# Patient Record
Sex: FEMALE | Race: WHITE | NOT HISPANIC OR LATINO | Employment: OTHER | URBAN - METROPOLITAN AREA
[De-identification: names, ages, dates, MRNs, and addresses within clinical notes are randomized per-mention and may not be internally consistent; named-entity substitution may affect disease eponyms.]

---

## 2022-03-20 ENCOUNTER — HOSPITAL ENCOUNTER (EMERGENCY)
Facility: HOSPITAL | Age: 42
Discharge: PSYCHIATRIC OR SUBSTANCE USE FACILITY | End: 2022-03-20
Attending: EMERGENCY MEDICINE
Payer: COMMERCIAL

## 2022-03-20 VITALS
BODY MASS INDEX: 28.12 KG/M2 | TEMPERATURE: 98 F | SYSTOLIC BLOOD PRESSURE: 134 MMHG | HEART RATE: 95 BPM | DIASTOLIC BLOOD PRESSURE: 66 MMHG | WEIGHT: 175 LBS | RESPIRATION RATE: 16 BRPM | OXYGEN SATURATION: 95 % | HEIGHT: 66 IN

## 2022-03-20 DIAGNOSIS — F10.929 ALCOHOLIC INTOXICATION WITH COMPLICATION: Primary | ICD-10-CM
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
## 2022-03-20 DIAGNOSIS — R41.82 AMS (ALTERED MENTAL STATUS): ICD-10-CM

## 2022-03-20 DIAGNOSIS — S09.93XA FACIAL INJURY, INITIAL ENCOUNTER: ICD-10-CM

## 2022-03-20 DIAGNOSIS — R45.851 SUICIDAL IDEATION: ICD-10-CM

## 2022-03-20 PROBLEM — F32.9 MAJOR DEPRESSIVE DISORDER: Status: ACTIVE | Noted: 2022-03-20

## 2022-03-20 LAB
ALBUMIN SERPL BCP-MCNC: 4 G/DL (ref 3.5–5.2)
ALP SERPL-CCNC: 45 U/L (ref 55–135)
ALT SERPL W/O P-5'-P-CCNC: 39 U/L (ref 10–44)
AMPHET+METHAMPHET UR QL: NEGATIVE
ANION GAP SERPL CALC-SCNC: 15 MMOL/L (ref 8–16)
APAP SERPL-MCNC: <3 UG/ML (ref 10–20)
AST SERPL-CCNC: 29 U/L (ref 10–40)
B-HCG UR QL: NEGATIVE
BARBITURATES UR QL SCN>200 NG/ML: NEGATIVE
BASOPHILS # BLD AUTO: 0.05 K/UL (ref 0–0.2)
BASOPHILS NFR BLD: 0.8 % (ref 0–1.9)
BENZODIAZ UR QL SCN>200 NG/ML: NEGATIVE
BILIRUB SERPL-MCNC: 0.3 MG/DL (ref 0.1–1)
BILIRUB UR QL STRIP: NEGATIVE
BUN SERPL-MCNC: 10 MG/DL (ref 6–20)
BZE UR QL SCN: NEGATIVE
CALCIUM SERPL-MCNC: 9.4 MG/DL (ref 8.7–10.5)
CANNABINOIDS UR QL SCN: ABNORMAL
CHLORIDE SERPL-SCNC: 109 MMOL/L (ref 95–110)
CLARITY UR REFRACT.AUTO: CLEAR
CO2 SERPL-SCNC: 21 MMOL/L (ref 23–29)
COLOR UR AUTO: NORMAL
CREAT SERPL-MCNC: 0.7 MG/DL (ref 0.5–1.4)
CREAT UR-MCNC: 10 MG/DL (ref 15–325)
CTP QC/QA: YES
CTP QC/QA: YES
DIFFERENTIAL METHOD: ABNORMAL
EOSINOPHIL # BLD AUTO: 0.2 K/UL (ref 0–0.5)
EOSINOPHIL NFR BLD: 2.9 % (ref 0–8)
ERYTHROCYTE [DISTWIDTH] IN BLOOD BY AUTOMATED COUNT: 12.4 % (ref 11.5–14.5)
EST. GFR  (AFRICAN AMERICAN): >60 ML/MIN/1.73 M^2
EST. GFR  (NON AFRICAN AMERICAN): >60 ML/MIN/1.73 M^2
ETHANOL SERPL-MCNC: 116 MG/DL
ETHANOL SERPL-MCNC: 238 MG/DL
ETHANOL SERPL-MCNC: 84 MG/DL
GLUCOSE SERPL-MCNC: 112 MG/DL (ref 70–110)
GLUCOSE UR QL STRIP: NEGATIVE
HCT VFR BLD AUTO: 42.5 % (ref 37–48.5)
HGB BLD-MCNC: 14.4 G/DL (ref 12–16)
HGB UR QL STRIP: NEGATIVE
IMM GRANULOCYTES # BLD AUTO: 0.01 K/UL (ref 0–0.04)
IMM GRANULOCYTES NFR BLD AUTO: 0.2 % (ref 0–0.5)
KETONES UR QL STRIP: NEGATIVE
LEUKOCYTE ESTERASE UR QL STRIP: NEGATIVE
LYMPHOCYTES # BLD AUTO: 2.4 K/UL (ref 1–4.8)
LYMPHOCYTES NFR BLD: 37.8 % (ref 18–48)
MCH RBC QN AUTO: 31.4 PG (ref 27–31)
MCHC RBC AUTO-ENTMCNC: 33.9 G/DL (ref 32–36)
MCV RBC AUTO: 93 FL (ref 82–98)
METHADONE UR QL SCN>300 NG/ML: NEGATIVE
MONOCYTES # BLD AUTO: 0.5 K/UL (ref 0.3–1)
MONOCYTES NFR BLD: 7.2 % (ref 4–15)
NEUTROPHILS # BLD AUTO: 3.2 K/UL (ref 1.8–7.7)
NEUTROPHILS NFR BLD: 51.1 % (ref 38–73)
NITRITE UR QL STRIP: NEGATIVE
NRBC BLD-RTO: 0 /100 WBC
OPIATES UR QL SCN: NEGATIVE
PCP UR QL SCN>25 NG/ML: NEGATIVE
PH UR STRIP: 6 [PH] (ref 5–8)
PLATELET # BLD AUTO: 193 K/UL (ref 150–450)
PMV BLD AUTO: 11.3 FL (ref 9.2–12.9)
POTASSIUM SERPL-SCNC: 4.2 MMOL/L (ref 3.5–5.1)
PROT SERPL-MCNC: 8 G/DL (ref 6–8.4)
PROT UR QL STRIP: NEGATIVE
RBC # BLD AUTO: 4.59 M/UL (ref 4–5.4)
SARS-COV-2 RDRP RESP QL NAA+PROBE: NEGATIVE
SODIUM SERPL-SCNC: 145 MMOL/L (ref 136–145)
SP GR UR STRIP: 1 (ref 1–1.03)
TOXICOLOGY INFORMATION: ABNORMAL
TSH SERPL DL<=0.005 MIU/L-ACNC: 2.57 UIU/ML (ref 0.4–4)
URN SPEC COLLECT METH UR: NORMAL
WBC # BLD AUTO: 6.27 K/UL (ref 3.9–12.7)

## 2022-03-20 PROCEDURE — U0002 COVID-19 LAB TEST NON-CDC: HCPCS | Performed by: EMERGENCY MEDICINE

## 2022-03-20 PROCEDURE — 82077 ASSAY SPEC XCP UR&BREATH IA: CPT | Mod: 91 | Performed by: EMERGENCY MEDICINE

## 2022-03-20 PROCEDURE — 81025 URINE PREGNANCY TEST: CPT | Performed by: EMERGENCY MEDICINE

## 2022-03-20 PROCEDURE — 25000003 PHARM REV CODE 250: Performed by: EMERGENCY MEDICINE

## 2022-03-20 PROCEDURE — 93005 ELECTROCARDIOGRAM TRACING: CPT

## 2022-03-20 PROCEDURE — 80307 DRUG TEST PRSMV CHEM ANLYZR: CPT | Performed by: EMERGENCY MEDICINE

## 2022-03-20 PROCEDURE — 87389 HIV-1 AG W/HIV-1&-2 AB AG IA: CPT | Performed by: EMERGENCY MEDICINE

## 2022-03-20 PROCEDURE — 96361 HYDRATE IV INFUSION ADD-ON: CPT

## 2022-03-20 PROCEDURE — 99285 EMERGENCY DEPT VISIT HI MDM: CPT | Mod: 25

## 2022-03-20 PROCEDURE — 80143 DRUG ASSAY ACETAMINOPHEN: CPT | Performed by: EMERGENCY MEDICINE

## 2022-03-20 PROCEDURE — 96375 TX/PRO/DX INJ NEW DRUG ADDON: CPT

## 2022-03-20 PROCEDURE — 81003 URINALYSIS AUTO W/O SCOPE: CPT | Mod: 59 | Performed by: EMERGENCY MEDICINE

## 2022-03-20 PROCEDURE — 80053 COMPREHEN METABOLIC PANEL: CPT | Performed by: EMERGENCY MEDICINE

## 2022-03-20 PROCEDURE — 99285 EMERGENCY DEPT VISIT HI MDM: CPT | Mod: CS,,, | Performed by: EMERGENCY MEDICINE

## 2022-03-20 PROCEDURE — 63600175 PHARM REV CODE 636 W HCPCS

## 2022-03-20 PROCEDURE — 93010 ELECTROCARDIOGRAM REPORT: CPT | Mod: ,,, | Performed by: INTERNAL MEDICINE

## 2022-03-20 PROCEDURE — 86803 HEPATITIS C AB TEST: CPT | Performed by: EMERGENCY MEDICINE

## 2022-03-20 PROCEDURE — 85025 COMPLETE CBC W/AUTO DIFF WBC: CPT | Performed by: EMERGENCY MEDICINE

## 2022-03-20 PROCEDURE — 99285 PR EMERGENCY DEPT VISIT,LEVEL V: ICD-10-PCS | Mod: CS,,, | Performed by: EMERGENCY MEDICINE

## 2022-03-20 PROCEDURE — 93010 EKG 12-LEAD: ICD-10-PCS | Mod: ,,, | Performed by: INTERNAL MEDICINE

## 2022-03-20 PROCEDURE — 63600175 PHARM REV CODE 636 W HCPCS: Performed by: EMERGENCY MEDICINE

## 2022-03-20 PROCEDURE — 96374 THER/PROPH/DIAG INJ IV PUSH: CPT

## 2022-03-20 PROCEDURE — 84443 ASSAY THYROID STIM HORMONE: CPT | Performed by: EMERGENCY MEDICINE

## 2022-03-20 RX ORDER — IBUPROFEN 400 MG/1
400 TABLET ORAL
Status: COMPLETED | OUTPATIENT
Start: 2022-03-20 | End: 2022-03-20

## 2022-03-20 RX ORDER — ACETAMINOPHEN 500 MG
1000 TABLET ORAL
Status: COMPLETED | OUTPATIENT
Start: 2022-03-20 | End: 2022-03-20

## 2022-03-20 RX ORDER — ONDANSETRON 2 MG/ML
4 INJECTION INTRAMUSCULAR; INTRAVENOUS
Status: COMPLETED | OUTPATIENT
Start: 2022-03-20 | End: 2022-03-20

## 2022-03-20 RX ORDER — CALCIUM CARBONATE 200(500)MG
1000 TABLET,CHEWABLE ORAL
Status: COMPLETED | OUTPATIENT
Start: 2022-03-20 | End: 2022-03-20

## 2022-03-20 RX ORDER — DROPERIDOL 2.5 MG/ML
2.5 INJECTION, SOLUTION INTRAMUSCULAR; INTRAVENOUS ONCE
Status: COMPLETED | OUTPATIENT
Start: 2022-03-20 | End: 2022-03-20

## 2022-03-20 RX ORDER — ONDANSETRON 2 MG/ML
INJECTION INTRAMUSCULAR; INTRAVENOUS
Status: COMPLETED
Start: 2022-03-20 | End: 2022-03-20

## 2022-03-20 RX ADMIN — ACETAMINOPHEN 1000 MG: 500 TABLET ORAL at 05:03

## 2022-03-20 RX ADMIN — ONDANSETRON 4 MG: 2 INJECTION INTRAMUSCULAR; INTRAVENOUS at 09:03

## 2022-03-20 RX ADMIN — SODIUM CHLORIDE 1000 ML: 0.9 INJECTION, SOLUTION INTRAVENOUS at 02:03

## 2022-03-20 RX ADMIN — CALCIUM CARBONATE (ANTACID) CHEW TAB 500 MG 1000 MG: 500 CHEW TAB at 03:03

## 2022-03-20 RX ADMIN — SODIUM CHLORIDE 1000 ML: 0.9 INJECTION, SOLUTION INTRAVENOUS at 11:03

## 2022-03-20 RX ADMIN — DROPERIDOL 2.5 MG: 2.5 INJECTION, SOLUTION INTRAMUSCULAR; INTRAVENOUS at 03:03

## 2022-03-20 RX ADMIN — IBUPROFEN 400 MG: 400 TABLET, FILM COATED ORAL at 09:03

## 2022-03-20 NOTE — HPI
"Perri Lemus is a 41 y.o. female with a past psychiatric history of generalized anxiety, currently presenting with fall in the setting of alcohol intoxication.  Psychiatry was originally consulted to address the patient's symptoms of suicidal ideation.    Initial Evaluation   Per Primary MD:  Perri Lemus is a 41 y.o. female with no known past medical history presenting to emergency department by EMS with complaint of alcohol intoxication, altered mental status, suicidal ideation, and fall with facial trauma.     Per EMS, patient was drinking alcohol with family members at a local bar when she bent over to pet a dog and fell forward onto her face.  She lost consciousness.  911 was called.  When EMS arrived, patient was tearful, crying, stating that she wanted to kill herself.  She stated that she was overwhelmed.     On my evaluation, patient is too somnolent to answer questions.     Spoke with patient's cousin who is present at bedside.  Patient's cousin states that 12 hours ago, when patient was sober, patient told her cousin that she wanted to hurt herself but did not say whether she had a plan.     Cousin knows that patient has history of depression and anxiety.  Unclear whether she has a history of previous psychiatric hospitalizations or suicidal ideation.     I was unable to reach the patient's  by phone to obtain additional history    Per [facility] Psych MD:  Patient seen in room, resting in bed. Reports that she had been drinking heavily yesterday when at a bar with her family and blacked out. Remembers having two glasses of wine, multiple shots of vodka, and a martini. Admitted to the hospital after she fell off her barstool while bending over to pet a dog. Patient does not remember making statements about suicide to the staff in the ED. However, she does admit to current passive suicidal thoughts, ("fall asleep and don't wake up"). She lives in [place] with her  but has been " "in [place] for the past week while visiting and caring for her mother who was recently placed in a nursing home. Reports feelings of depression for the last year associated with increased sleep, anhedonia, feelings of guilt, low energy, poor concentration. She reports significantly cutting back her drinking one year ago. Previously drank 1-2 bottles of wine per day but now reports she drinks only socially. States that prior to last night, her last drink was in December, the last time she was in [place]. Admits that her mind goes to "dark places" when she is drinking and that her suicidal ideation gets significantly worse. Treated for anxiety with Effexor, recently increased from 150 mg to 225 mg one month ago. Reports that it has helped her anxiety significantly but she remains depressed. Also prescribed 0.5 mg Ativan for breakthrough anxiety, which she takes once or twice per week. Previous suicide attempt via overdose on Ativan >1 year ago. Patient reports that she was drinking at the time and was hospitalized in a psychiatric hospital. She does not see a psychiatrist. Denies history of corina or psychosis. Reports regular marijuana use.  has multiple firearms in his home in [place].    Collateral:   Yes - Aunt present. Reports that patient has made passive suicidal statements since arrival in [place]. She also reports patient has been drinking significantly more than she says. States she has drank at least 1.5 bottles of wine per night for the last week.     Psychiatric Review Of Systems - Is patient experiencing or having changes in:  sleep: yes, increased  appetite/weight: no  energy/anergy: yes  interest/pleasure/anhedonia: yes  somatic symptoms: no  guilty/hopelessness: yes  concentration: yes  S.I.B.s/risky behavior: no  SI/SA:  yes  anxiety/panic: yes  Irritability: no  Racing thoughts: no  Pressured speech:  no  Paranoia:no  Delusions: no  AVH:no    Medical Review Of Systems:  Pertinent " "items noted in HPI    Hospital Course   See HPI    History   Psychiatric History:  Diagnose(s): Yes - generalized anxiety disorder  Current Psychiatric Medications: Yes - Effexor 225 mg daily, Ativan 0.5 mg PRN  Previous Medication Trials: No  Previous Psychiatric Hospitalizations: Yes  Previous Suicide Attempts: Yes  History of Violence: No  Outpatient Psychiatrist: No    Social History:  Marital Status:   Children: 0   Employment Status: currently employed  Housing Status: Yes - with   History of Abuse: No  Access to Gun: Yes -  owns multiple guns, locked away, patient may have access    Substance Abuse History:  Recreational Drugs: marijuana  Use of Alcohol: heavy and history of blackouts  Rehab History: No  Tobacco Use: No  If applicable, legal consequences of chemical use: no  If applicable, is the patient aware of the biomedical complications associated with substance abuse and mental illness? yes    Legal History:  denies    Psychosocial Factors:  Psychosocial Stressors: family, financial, and drug and alcohol.   Functioning Relationships: good support system    Family Psychiatric History:   Yes - depression on mother's side    Past Medical/Surgical History:  No past medical history on file.  No past surgical history on file.    Objective:   Vital Signs:  Temp:  [97.8 °F (36.6 °C)-98.1 °F (36.7 °C)]   Pulse:  []   Resp:  [16-18]   BP: (123-164)/(62-84)   SpO2:  [95 %-100 %]     Mental Status Exam:  Appearance: unremarkable, age appropriate  Level of Consciousness: awake, alert  Behavior/Cooperation: friendly and cooperative  Psychomotor: unremarkable   Speech: normal tone, normal rate, normal pitch, normal volume  Language: english, fluid  Orientation: grossly intact  Attention Span/Concentration: spelled "WORLD" forwards and backwards  Memory: Registers and recalls 3/3 objects at 3 minutes  Mood: "depressed, tearful  Affect: normal and tearful  Thought Process: linear, normal and " logical  Associations: normal and logical  Thought Content: suicidal thoughts: (passive-yes)  Fund of Knowledge: Aware of current events  Insight: fair  Judgment: limited    Laboratory Data:  CBC:   Recent Labs   Lab 03/20/22  0150   WBC 6.27   HGB 14.4   HCT 42.5        CMP:   Recent Labs   Lab 03/20/22  0150      K 4.2      CO2 21*   *   BUN 10   CALCIUM 9.4   PROT 8.0   ALBUMIN 4.0   BILITOT 0.3   ALKPHOS 45*   AST 29   ALT 39   ANIONGAP 15   EGFRNONAA >60.0       Significant Labs: All pertinent labs within the past 24 hours have been reviewed.  Significant Imaging: I have reviewed all pertinent imaging results/findings within the past 24 hours.

## 2022-03-20 NOTE — ED PROVIDER NOTES
Source of History:  EMS  Patient currently too altered to provide any history  Cousin  Unable to reach  Misha at phone number [phone]        Chief complaint:  Fall (+ ETOH. Fall at bar. Questionable LOC. EMS reports patient with SI )      HPI:  Perri Lemus is a 41 y.o. female with no known past medical history presenting to emergency department by EMS with complaint of alcohol intoxication, altered mental status, suicidal ideation, and fall with facial trauma.    Per EMS, patient was drinking alcohol with family members at a local bar when she bent over to pet a dog and fell forward onto her face.  She lost consciousness.  911 was called.  When EMS arrived, patient was tearful, crying, stating that she wanted to kill herself.  She stated that she was overwhelmed.    On my evaluation, patient is too somnolent to answer questions.    Spoke with patient's cousin who is present at bedside.  Patient's cousin states that 12 hours ago, when patient was sober, patient told her cousin that she wanted to hurt herself but did not say whether she had a plan.    Cousin knows that patient has history of depression and anxiety.  Unclear whether she has a history of previous psychiatric hospitalizations or suicidal ideation.    I was unable to reach the patient's  by phone to obtain additional history    ROS: As per HPI and below:  Review of Systems   Unable to perform ROS: Mental status change     Review of patient's allergies indicates:  No Known Allergies    No current facility-administered medications on file prior to encounter.     No current outpatient medications on file prior to encounter.       PMH:  As per HPI and below:  No past medical history on file.  No past surgical history on file.    Social History     Socioeconomic History    Marital status:        No family history on file.    Physical Exam:      Vitals:    03/20/22 0121   BP: (!) 164/84   Pulse: (!) 113   Resp: 17   Temp: 98  °F (36.7 °C)     Gen: No acute distress.  Nontoxic.  Well appearing.  Mental Status:  Somnolent but arousable.  Skin: Warm, dry. No rashes seen.  Eyes: No conjunctival injection.  ENT: Ecchymosis and swelling to the bridge of the nose.  No dental trauma.  No.  Orbital ecchymosis noted.  Pulm: CTAB. No increased work of breathing.  No significant tachypnea.  No audible stridor or wheezing.  No conversational dyspnea.    CV: Tachycardic. Regular rhythm.   Abd: Soft.  Not distended.  Nontender.   MSK: Good range of motion all joints.  No deformities.    Neuro:  Eyes closed.  Moves all extremities.  Does not follow commands.    Laboratory Studies:  Labs Reviewed   CBC W/ AUTO DIFFERENTIAL - Abnormal; Notable for the following components:       Result Value    MCH 31.4 (*)     All other components within normal limits    Narrative:     Release to patient->Immediate   COMPREHENSIVE METABOLIC PANEL - Abnormal; Notable for the following components:    CO2 21 (*)     Glucose 112 (*)     Alkaline Phosphatase 45 (*)     All other components within normal limits    Narrative:     Release to patient->Immediate   DRUG SCREEN PANEL, URINE EMERGENCY - Abnormal; Notable for the following components:    THC Presumptive Positive (*)     Creatinine, Urine 10.0 (*)     All other components within normal limits    Narrative:     Specimen Source->Urine   ALCOHOL,MEDICAL (ETHANOL) - Abnormal; Notable for the following components:    Alcohol, Serum 238 (*)     All other components within normal limits    Narrative:     Release to patient->Immediate   ACETAMINOPHEN LEVEL - Abnormal; Notable for the following components:    Acetaminophen (Tylenol), Serum <3.0 (*)     All other components within normal limits    Narrative:     Release to patient->Immediate   TSH    Narrative:     Release to patient->Immediate   URINALYSIS, REFLEX TO URINE CULTURE    Narrative:     Specimen Source->Urine   HIV 1 / 2 ANTIBODY   HEPATITIS C ANTIBODY   SARS-COV-2  RDRP GENE    Narrative:     This test utilizes isothermal nucleic acid amplification   technology to detect the SARS-CoV-2 RdRp nucleic acid segment.   The analytical sensitivity (limit of detection) is 125 genome   equivalents/mL.   A POSITIVE result implies infection with the SARS-CoV-2 virus;   the patient is presumed to be contagious.     A NEGATIVE result means that SARS-CoV-2 nucleic acids are not   present above the limit of detection. A NEGATIVE result should be   treated as presumptive. It does not rule out the possibility of   COVID-19 and should not be the sole basis for treatment decisions.   If COVID-19 is strongly suspected based on clinical and exposure   history, re-testing using an alternate molecular assay should be   considered.   This test is only for use under the Food and Drug   Administration s Emergency Use Authorization (EUA).   Commercial kits are provided by [employer].   Performance characteristics of the EUA have been independently   verified by [facility] Department of   Pathology and Laboratory Medicine.   _________________________________________________________________   The authorized Fact Sheet for Healthcare Providers and the authorized Fact   Sheet for Patients of the ID NOW COVID-19 are available on the FDA   website:     https://www.fda.gov/media/494769/download  https://www.fda.gov/media/933264/download          POCT URINE PREGNANCY     Chart reviewed.     Imaging Results          CT Head Without Contrast (In process)  Result time 03/20/22 02:35:43               CT Cervical Spine Without Contrast (In process)  Result time 03/20/22 02:35:43               CT Maxillofacial Without Contrast (In process)  Result time 03/20/22 02:35:43                Medications Given:  Medications   sodium chloride 0.9% bolus 1,000 mL (0 mLs Intravenous Stopped 3/20/22 0306)       MDM:    41 y.o. female with complaint of alcohol intoxication and suicidal ideation.  Afebrile,  tachycardic, normotensive.  No focal neuro deficits noted but patient is clinically intoxicated.    Due to history of suicidal statements while sober earlier in the day, patient was placed under pec.  Labs remarkable for elevated serum ethanol level.    Signed out to oncoming physician pending CT scans, repeat ethanol level for final disposition, anticipate transfer to psychiatric facility.    Diagnostic Impression:    1. Alcoholic intoxication with complication    2. Facial injury, initial encounter    3. Fall, initial encounter    4. Suicidal ideation         ED Disposition Condition    Transfer to Psych Facility         ED Prescriptions     None        Follow-up Information    None         Grecia Abraham MD  Emergency Medicine       Grecia Abraham MD  03/20/22 6765

## 2022-03-20 NOTE — CONSULTS
[facility] - Emergency Dept  Psychiatry  Consult Note    Patient Name: Perri Lemus  MRN: [id number]   Code Status: No Order  Admission Date: 3/20/2022  Hospital Length of Stay: 0 days  Attending Physician: No att. providers found  Primary Care Provider: Provider [facility]    Current Legal Status: Physician's Emergency Certificate (PEC)    Patient information was obtained from patient, relative(s) and ER records.   Consults  Subjective:     Principal Problem:<principal problem not specified>    Chief Complaint:  Suicidial ideations     HPI:   Perri Lemus is a 41 y.o. female with a past psychiatric history of generalized anxiety, currently presenting with fall in the setting of alcohol intoxication.  Psychiatry was originally consulted to address the patient's symptoms of suicidal ideation.    Initial Evaluation   Per Primary MD:  Perri Lemus is a 41 y.o. female with no known past medical history presenting to emergency department by EMS with complaint of alcohol intoxication, altered mental status, suicidal ideation, and fall with facial trauma.     Per EMS, patient was drinking alcohol with family members at a local bar when she bent over to pet a dog and fell forward onto her face.  She lost consciousness.  911 was called.  When EMS arrived, patient was tearful, crying, stating that she wanted to kill herself.  She stated that she was overwhelmed.     On my evaluation, patient is too somnolent to answer questions.     Spoke with patient's cousin who is present at bedside.  Patient's cousin states that 12 hours ago, when patient was sober, patient told her cousin that she wanted to hurt herself but did not say whether she had a plan.     Cousin knows that patient has history of depression and anxiety.  Unclear whether she has a history of previous psychiatric hospitalizations or suicidal ideation.     I was unable to reach the patient's  by phone to obtain additional history    Per C-L  "Psych MD:  Patient seen in room, resting in bed. Reports that she had been drinking heavily yesterday when at a bar with her family and blacked out. Remembers having two glasses of wine, multiple shots of vodka, and a martini. Admitted to the hospital after she fell off her barstool while bending over to pet a dog. Patient does not remember making statements about suicide to the staff in the ED. However, she does admit to current passive suicidal thoughts, ("fall asleep and don't wake up"). She lives in [place] with her  but has been in [place] for the past week while visiting and caring for her mother who was recently placed in a nursing home. Reports feelings of depression for the last year associated with increased sleep, anhedonia, feelings of guilt, low energy, poor concentration. She reports significantly cutting back her drinking one year ago. Previously drank 1-2 bottles of wine per day but now reports she drinks only socially. States that prior to last night, her last drink was in December, the last time she was in [place]. Admits that her mind goes to "dark places" when she is drinking and that her suicidal ideation gets significantly worse. Treated for anxiety with Effexor, recently increased from 150 mg to 225 mg one month ago. Reports that it has helped her anxiety significantly but she remains depressed. Also prescribed 0.5 mg Ativan for breakthrough anxiety, which she takes once or twice per week. Previous suicide attempt via overdose on Ativan >1 year ago. Patient reports that she was drinking at the time and was hospitalized in a psychiatric hospital. She does not see a psychiatrist. Denies history of corina or psychosis. Reports regular marijuana use.  has multiple firearms in his home in [place].    Collateral:   Yes - Aunt present. Reports that patient has made passive suicidal statements since arrival in [place]. She also reports patient has been drinking " significantly more than she says. States she has drank at least 1.5 bottles of wine per night for the last week.     Psychiatric Review Of Systems - Is patient experiencing or having changes in:  sleep: yes, increased  appetite/weight: no  energy/anergy: yes  interest/pleasure/anhedonia: yes  somatic symptoms: no  guilty/hopelessness: yes  concentration: yes  S.I.B.s/risky behavior: no  SI/SA:  yes  anxiety/panic: yes  Irritability: no  Racing thoughts: no  Pressured speech:  no  Paranoia:no  Delusions: no  AVH:no    Medical Review Of Systems:  Pertinent items noted in HPI    Hospital Course   See HPI    History   Psychiatric History:  Diagnose(s): Yes - generalized anxiety disorder  Current Psychiatric Medications: Yes - Effexor 225 mg daily, Ativan 0.5 mg PRN  Previous Medication Trials: No  Previous Psychiatric Hospitalizations: Yes  Previous Suicide Attempts: Yes  History of Violence: No  Outpatient Psychiatrist: No    Social History:  Marital Status:   Children: 0   Employment Status: currently employed  Housing Status: Yes - with   History of Abuse: No  Access to Gun: Yes -  owns multiple guns, locked away, patient may have access    Substance Abuse History:  Recreational Drugs: marijuana  Use of Alcohol: heavy and history of blackouts  Rehab History: No  Tobacco Use: No  If applicable, legal consequences of chemical use: no  If applicable, is the patient aware of the biomedical complications associated with substance abuse and mental illness? yes    Legal History:  denies    Psychosocial Factors:  Psychosocial Stressors: family, financial, and drug and alcohol.   Functioning Relationships: good support system    Family Psychiatric History:   Yes - depression on mother's side    Past Medical/Surgical History:  No past medical history on file.  No past surgical history on file.    Objective:   Vital Signs:  Temp:  [97.8 °F (36.6 °C)-98.1 °F (36.7 °C)]   Pulse:  []   Resp:  [16-18]  "  BP: (123-164)/(62-84)   SpO2:  [95 %-100 %]     Mental Status Exam:  Appearance: unremarkable, age appropriate  Level of Consciousness: awake, alert  Behavior/Cooperation: friendly and cooperative  Psychomotor: unremarkable   Speech: normal tone, normal rate, normal pitch, normal volume  Language: english, fluid  Orientation: grossly intact  Attention Span/Concentration: spelled "WORLD" forwards and backwards  Memory: Registers and recalls 3/3 objects at 3 minutes  Mood: "depressed, tearful  Affect: normal and tearful  Thought Process: linear, normal and logical  Associations: normal and logical  Thought Content: suicidal thoughts: (passive-yes)  Fund of Knowledge: Aware of current events  Insight: fair  Judgment: limited    Laboratory Data:  CBC:   Recent Labs   Lab 03/20/22  0150   WBC 6.27   HGB 14.4   HCT 42.5        CMP:   Recent Labs   Lab 03/20/22  0150      K 4.2      CO2 21*   *   BUN 10   CALCIUM 9.4   PROT 8.0   ALBUMIN 4.0   BILITOT 0.3   ALKPHOS 45*   AST 29   ALT 39   ANIONGAP 15   EGFRNONAA >60.0       Significant Labs: All pertinent labs within the past 24 hours have been reviewed.  Significant Imaging: I have reviewed all pertinent imaging results/findings within the past 24 hours.        Hospital Course: No notes on file    No new subjective & objective note has been filed under this hospital service since the last note was generated.    Assessment/Plan:     Major depressive disorder  ASSESSMENT     Perri Lemus is a 41 y.o. female with a past psychiatric history of MARLENI, alcohol use disorder, who presented to the [facility] due to fall from barstool while intoxicated. Psych consulted due to patient making multiple statements about wanting her life to end while in the ED. PEC due to danger to herself. Hx of suicide attempt >1 year ago via overdose on home medication Ativan. Recent heavy alcohol use. Reporting symptoms of depression.  has guns at home. Continue " PEC.    IMPRESSION  Major depressive disorder  Hx MARLENI  Alcohol use disorder    RECOMMENDATION(S)      - Continue home Effexor 225 mg daily  - Continue PEC and seek placement in inpatient psych hospital         Total Time:  60 minutes      Sanford James MD   Psychiatry  [facility] - Emergency Dept

## 2022-03-20 NOTE — PROVIDER PROGRESS NOTES - EMERGENCY DEPT.
I have assumed care for this patient from Dr. Abraham 6:19 AM . I have reviewed case, and have seen patient. I have read the chart and discussed care with the previous attending. I agree with the plan as previously determined. Since assuming care, the patient's course includes:    40 yo F w/ SI, intoxicated. Patient reported SI when sober, hx of anxiety and depression  PEC-ed by     Pending:  ETOH at 9 AM  CT head/max/face/C spine    CT head/c-spine:1. No acute intracranial abnormality. 2. No acute facial fracture  CT C spine:Motion limited study. No obvious acute bony abnormality identified in the cervical spine.  Further evaluation/follow-up as warranted.   Reversal of the normal cervical lordosis.   Mild-to-moderate cervical spondylosis.    Ct max face no acute, no acute facial fx    patient comfortable in bed  Pending ETOH level for medical clearance    1:00 PM etoh 84. patient medically clear  Psych consult pending (multiple attempts to contact the team unsuccessful). Since sober, patient denies any SI    2:36 PM  Evaluated by Psychiatry, plan for keep PEC, transfer to inpatient psych facility

## 2022-03-20 NOTE — ASSESSMENT & PLAN NOTE
ASSESSMENT     Perri Lemus is a 41 y.o. female with a past psychiatric history of MARLENI, alcohol use disorder, who presented to the [facility] due to fall from barstool while intoxicated. Psych consulted due to patient making multiple statements about wanting her life to end while in the ED. PEC due to danger to herself. Hx of suicide attempt >1 year ago via overdose on home medication Ativan. Recent heavy alcohol use. Reporting symptoms of depression.  has guns at home. Continue PEC.    IMPRESSION  Major depressive disorder  Hx MARLENI  Alcohol use disorder    RECOMMENDATION(S)      - Continue home Effexor 225 mg daily  - Continue PEC and seek placement in inpatient psych hospital

## 2022-03-20 NOTE — ED NOTES
Aunt left for the night but is worried she will freak out without her there. The aunt left her cell phone number with me incase needed. The aunt's cell phone is [phone], her name is Glenny.

## 2022-03-20 NOTE — ED NOTES
"Pt received to ED bed 17. Upon pt arrival pt was lethargic and uncooperative changing into blue scrubs. Pt changed into blue scrubs with prompting / refused to sign PEC documents provided.  Pt crying and anxious. Pt continues to state " I just want to die. I want it to be over. I can't keep taking care of everyone. I don't want to go back to [place]. I just want to die." Pt also stating " I hope my covid test is positive so I can stay in the [place]." Patient reports suicidal ideation but is denying homicidal ideation and denies auditory or visual hallucinations. Per family report pt was at bar and fell over hitting face on ground.  Pt unsure of LOC. Family denied LOC. Pt reporting head pain. Pt denies neck/back pain. Bruising noted to nose. Pt denies n/v/ mental status change. Pt and family deny seizure activity. Pt denies vision changes/ dizziness. Pt denies pain/ weakness/ numbness to extremities. Pt denies tingling/ bladder/bowel incontinence. Pt able to ambulate with assistance to restroom and provide urine sample. Assessment done per flow sheet. Pt verbalizes understanding of plan of care. 1:1 direct observation sitter at bedside. Risk sitter does not have any personal belongings in the room, and is charted on Q15 minutes per protocol.       Pt arrives with clothing only to room. Shoes, underwear, bra, pants, and shirt removed from pt and given to tech sitter. Pt in blue scrubs at this time.  "

## 2022-03-20 NOTE — ED NOTES
Patient returned from CT without incident. Will continue to monitor and await results/further orders/disposition. See event log/doc flowsheets for continued assessments/interventions. Bed in lowest position and locked. ED evaluation continues.

## 2022-03-22 LAB — HCV AB SERPL QL IA: NEGATIVE

## 2022-03-23 LAB — HIV 1+2 AB+HIV1 P24 AG SERPL QL IA: NEGATIVE
